# Patient Record
Sex: MALE | Race: WHITE | NOT HISPANIC OR LATINO
[De-identification: names, ages, dates, MRNs, and addresses within clinical notes are randomized per-mention and may not be internally consistent; named-entity substitution may affect disease eponyms.]

---

## 2017-10-21 ENCOUNTER — TRANSCRIPTION ENCOUNTER (OUTPATIENT)
Age: 52
End: 2017-10-21

## 2017-10-21 ENCOUNTER — INPATIENT (INPATIENT)
Facility: HOSPITAL | Age: 52
LOS: 0 days | Discharge: HOME | DRG: 352 | End: 2017-10-22
Attending: SURGERY | Admitting: SURGERY
Payer: COMMERCIAL

## 2017-10-21 VITALS
RESPIRATION RATE: 16 BRPM | DIASTOLIC BLOOD PRESSURE: 78 MMHG | TEMPERATURE: 100 F | SYSTOLIC BLOOD PRESSURE: 122 MMHG | OXYGEN SATURATION: 99 % | HEART RATE: 99 BPM | HEIGHT: 70 IN | WEIGHT: 156.09 LBS

## 2017-10-21 DIAGNOSIS — R10.84 GENERALIZED ABDOMINAL PAIN: ICD-10-CM

## 2017-10-21 LAB
ALBUMIN SERPL ELPH-MCNC: 2.8 G/DL — LOW (ref 3.3–5)
ALP SERPL-CCNC: 189 U/L — HIGH (ref 40–120)
ALT FLD-CCNC: 100 U/L — HIGH (ref 12–78)
AMYLASE P1 CFR SERPL: 53 U/L — SIGNIFICANT CHANGE UP (ref 25–115)
ANION GAP SERPL CALC-SCNC: 6 MMOL/L — SIGNIFICANT CHANGE UP (ref 5–17)
APPEARANCE UR: CLEAR — SIGNIFICANT CHANGE UP
AST SERPL-CCNC: 64 U/L — HIGH (ref 15–37)
BASOPHILS # BLD AUTO: 0.1 K/UL — SIGNIFICANT CHANGE UP (ref 0–0.2)
BASOPHILS NFR BLD AUTO: 0.7 % — SIGNIFICANT CHANGE UP (ref 0–2)
BILIRUB SERPL-MCNC: 0.5 MG/DL — SIGNIFICANT CHANGE UP (ref 0.2–1.2)
BILIRUB UR-MCNC: NEGATIVE — SIGNIFICANT CHANGE UP
BUN SERPL-MCNC: 18 MG/DL — SIGNIFICANT CHANGE UP (ref 7–23)
CALCIUM SERPL-MCNC: 8.9 MG/DL — SIGNIFICANT CHANGE UP (ref 8.5–10.1)
CHLORIDE SERPL-SCNC: 101 MMOL/L — SIGNIFICANT CHANGE UP (ref 96–108)
CO2 SERPL-SCNC: 30 MMOL/L — SIGNIFICANT CHANGE UP (ref 22–31)
COLOR SPEC: YELLOW — SIGNIFICANT CHANGE UP
CREAT SERPL-MCNC: 0.79 MG/DL — SIGNIFICANT CHANGE UP (ref 0.5–1.3)
DIFF PNL FLD: NEGATIVE — SIGNIFICANT CHANGE UP
EOSINOPHIL # BLD AUTO: 0.3 K/UL — SIGNIFICANT CHANGE UP (ref 0–0.5)
EOSINOPHIL NFR BLD AUTO: 2.7 % — SIGNIFICANT CHANGE UP (ref 0–6)
GLUCOSE SERPL-MCNC: 98 MG/DL — SIGNIFICANT CHANGE UP (ref 70–99)
GLUCOSE UR QL: NEGATIVE — SIGNIFICANT CHANGE UP
HCT VFR BLD CALC: 40.6 % — SIGNIFICANT CHANGE UP (ref 39–50)
HGB BLD-MCNC: 13.1 G/DL — SIGNIFICANT CHANGE UP (ref 13–17)
INR BLD: 1.15 RATIO — SIGNIFICANT CHANGE UP (ref 0.88–1.16)
KETONES UR-MCNC: NEGATIVE — SIGNIFICANT CHANGE UP
LACTATE SERPL-SCNC: 1.3 MMOL/L — SIGNIFICANT CHANGE UP (ref 0.7–2)
LEUKOCYTE ESTERASE UR-ACNC: NEGATIVE — SIGNIFICANT CHANGE UP
LIDOCAIN IGE QN: 147 U/L — SIGNIFICANT CHANGE UP (ref 73–393)
LYMPHOCYTES # BLD AUTO: 1.3 K/UL — SIGNIFICANT CHANGE UP (ref 1–3.3)
LYMPHOCYTES # BLD AUTO: 11.4 % — LOW (ref 13–44)
MCHC RBC-ENTMCNC: 28.1 PG — SIGNIFICANT CHANGE UP (ref 27–34)
MCHC RBC-ENTMCNC: 32.2 GM/DL — SIGNIFICANT CHANGE UP (ref 32–36)
MCV RBC AUTO: 87.1 FL — SIGNIFICANT CHANGE UP (ref 80–100)
MONOCYTES # BLD AUTO: 1.4 K/UL — HIGH (ref 0–0.9)
MONOCYTES NFR BLD AUTO: 12.1 % — HIGH (ref 1–9)
NEUTROPHILS # BLD AUTO: 8.2 K/UL — HIGH (ref 1.8–7.4)
NEUTROPHILS NFR BLD AUTO: 73 % — SIGNIFICANT CHANGE UP (ref 43–77)
NITRITE UR-MCNC: NEGATIVE — SIGNIFICANT CHANGE UP
PH UR: 7 — SIGNIFICANT CHANGE UP (ref 5–8)
PLATELET # BLD AUTO: 300 K/UL — SIGNIFICANT CHANGE UP (ref 150–400)
POTASSIUM SERPL-MCNC: 4.3 MMOL/L — SIGNIFICANT CHANGE UP (ref 3.5–5.3)
POTASSIUM SERPL-SCNC: 4.3 MMOL/L — SIGNIFICANT CHANGE UP (ref 3.5–5.3)
PROT SERPL-MCNC: 7.9 G/DL — SIGNIFICANT CHANGE UP (ref 6–8.3)
PROT UR-MCNC: NEGATIVE — SIGNIFICANT CHANGE UP
PROTHROM AB SERPL-ACNC: 12.6 SEC — SIGNIFICANT CHANGE UP (ref 9.8–12.7)
RBC # BLD: 4.67 M/UL — SIGNIFICANT CHANGE UP (ref 4.2–5.8)
RBC # FLD: 11.5 % — SIGNIFICANT CHANGE UP (ref 10.3–14.5)
SODIUM SERPL-SCNC: 137 MMOL/L — SIGNIFICANT CHANGE UP (ref 135–145)
SP GR SPEC: 1 — LOW (ref 1.01–1.02)
UROBILINOGEN FLD QL: NEGATIVE — SIGNIFICANT CHANGE UP
WBC # BLD: 11.2 K/UL — HIGH (ref 3.8–10.5)
WBC # FLD AUTO: 11.2 K/UL — HIGH (ref 3.8–10.5)

## 2017-10-21 PROCEDURE — 74020: CPT | Mod: 26

## 2017-10-21 PROCEDURE — 71020: CPT | Mod: 26

## 2017-10-21 PROCEDURE — 76870 US EXAM SCROTUM: CPT | Mod: 26

## 2017-10-21 PROCEDURE — 74177 CT ABD & PELVIS W/CONTRAST: CPT | Mod: 26

## 2017-10-21 PROCEDURE — 76705 ECHO EXAM OF ABDOMEN: CPT | Mod: 26

## 2017-10-21 PROCEDURE — 99285 EMERGENCY DEPT VISIT HI MDM: CPT

## 2017-10-21 RX ORDER — ACETAMINOPHEN 500 MG
650 TABLET ORAL ONCE
Qty: 0 | Refills: 0 | Status: COMPLETED | OUTPATIENT
Start: 2017-10-21 | End: 2017-10-21

## 2017-10-21 RX ORDER — SODIUM CHLORIDE 9 MG/ML
1000 INJECTION INTRAMUSCULAR; INTRAVENOUS; SUBCUTANEOUS
Qty: 0 | Refills: 0 | Status: COMPLETED | OUTPATIENT
Start: 2017-10-21 | End: 2017-10-21

## 2017-10-21 RX ORDER — MORPHINE SULFATE 50 MG/1
4 CAPSULE, EXTENDED RELEASE ORAL ONCE
Qty: 0 | Refills: 0 | Status: DISCONTINUED | OUTPATIENT
Start: 2017-10-21 | End: 2017-10-21

## 2017-10-21 RX ORDER — MORPHINE SULFATE 50 MG/1
4 CAPSULE, EXTENDED RELEASE ORAL EVERY 6 HOURS
Qty: 0 | Refills: 0 | Status: DISCONTINUED | OUTPATIENT
Start: 2017-10-21 | End: 2017-10-22

## 2017-10-21 RX ORDER — SODIUM CHLORIDE 9 MG/ML
1000 INJECTION, SOLUTION INTRAVENOUS
Qty: 0 | Refills: 0 | Status: DISCONTINUED | OUTPATIENT
Start: 2017-10-21 | End: 2017-10-22

## 2017-10-21 RX ORDER — PIPERACILLIN AND TAZOBACTAM 4; .5 G/20ML; G/20ML
3.38 INJECTION, POWDER, LYOPHILIZED, FOR SOLUTION INTRAVENOUS ONCE
Qty: 0 | Refills: 0 | Status: COMPLETED | OUTPATIENT
Start: 2017-10-21 | End: 2017-10-21

## 2017-10-21 RX ORDER — KETOROLAC TROMETHAMINE 30 MG/ML
15 SYRINGE (ML) INJECTION ONCE
Qty: 0 | Refills: 0 | Status: DISCONTINUED | OUTPATIENT
Start: 2017-10-21 | End: 2017-10-21

## 2017-10-21 RX ORDER — ONDANSETRON 8 MG/1
4 TABLET, FILM COATED ORAL ONCE
Qty: 0 | Refills: 0 | Status: COMPLETED | OUTPATIENT
Start: 2017-10-21 | End: 2017-10-21

## 2017-10-21 RX ORDER — CEFAZOLIN SODIUM 1 G
2000 VIAL (EA) INJECTION ONCE
Qty: 0 | Refills: 0 | Status: COMPLETED | OUTPATIENT
Start: 2017-10-21 | End: 2017-10-21

## 2017-10-21 RX ORDER — ONDANSETRON 8 MG/1
4 TABLET, FILM COATED ORAL EVERY 6 HOURS
Qty: 0 | Refills: 0 | Status: DISCONTINUED | OUTPATIENT
Start: 2017-10-21 | End: 2017-10-22

## 2017-10-21 RX ORDER — ENOXAPARIN SODIUM 100 MG/ML
40 INJECTION SUBCUTANEOUS DAILY
Qty: 0 | Refills: 0 | Status: DISCONTINUED | OUTPATIENT
Start: 2017-10-21 | End: 2017-10-22

## 2017-10-21 RX ORDER — IOHEXOL 300 MG/ML
30 INJECTION, SOLUTION INTRAVENOUS ONCE
Qty: 0 | Refills: 0 | Status: COMPLETED | OUTPATIENT
Start: 2017-10-21 | End: 2017-10-21

## 2017-10-21 RX ADMIN — IOHEXOL 30 MILLILITER(S): 300 INJECTION, SOLUTION INTRAVENOUS at 17:14

## 2017-10-21 RX ADMIN — SODIUM CHLORIDE 1000 MILLILITER(S): 9 INJECTION INTRAMUSCULAR; INTRAVENOUS; SUBCUTANEOUS at 17:15

## 2017-10-21 RX ADMIN — MORPHINE SULFATE 4 MILLIGRAM(S): 50 CAPSULE, EXTENDED RELEASE ORAL at 17:14

## 2017-10-21 RX ADMIN — ONDANSETRON 4 MILLIGRAM(S): 8 TABLET, FILM COATED ORAL at 17:14

## 2017-10-21 RX ADMIN — Medication 650 MILLIGRAM(S): at 17:16

## 2017-10-21 RX ADMIN — Medication 15 MILLIGRAM(S): at 17:14

## 2017-10-21 RX ADMIN — PIPERACILLIN AND TAZOBACTAM 200 GRAM(S): 4; .5 INJECTION, POWDER, LYOPHILIZED, FOR SOLUTION INTRAVENOUS at 17:16

## 2017-10-21 NOTE — ED PROVIDER NOTE - PROGRESS NOTE DETAILS
patient feeling better, abdomen soft, non tender, hernia reducible, spoke with surgery on call Dr. Marin, case discussed, ct and labs reviwed, to dc home, f/u as outpatient, understands to return to ER if having severe pain, prolonged vomiting or worsening of symptoms spoke with Dr. Marin, case discussed, ct and labs reviwed, will admit patient

## 2017-10-21 NOTE — ED PROVIDER NOTE - OBJECTIVE STATEMENT
51 male presents to ER c/o tactile fever and abdominal pain for the past 2 days, no vomiting, having intermitant nausea, decreased appetite. Patient states he has had right scrotal swelling 51 male presents to ER c/o tactile fever and abdominal pain for the past 2 days, no vomiting, having intermitant nausea, decreased appetite. Patient states he has had right scrotal swelling for the past few months.

## 2017-10-21 NOTE — ED PROVIDER NOTE - CARE PLAN
Principal Discharge DX:	Generalized abdominal pain  Secondary Diagnosis:	Unilateral inguinal hernia without obstruction or gangrene, recurrence not specified

## 2017-10-22 ENCOUNTER — TRANSCRIPTION ENCOUNTER (OUTPATIENT)
Age: 52
End: 2017-10-22

## 2017-10-22 VITALS
SYSTOLIC BLOOD PRESSURE: 113 MMHG | DIASTOLIC BLOOD PRESSURE: 68 MMHG | HEART RATE: 76 BPM | TEMPERATURE: 98 F | OXYGEN SATURATION: 100 % | RESPIRATION RATE: 16 BRPM

## 2017-10-22 DIAGNOSIS — K40.90 UNILATERAL INGUINAL HERNIA, WITHOUT OBSTRUCTION OR GANGRENE, NOT SPECIFIED AS RECURRENT: ICD-10-CM

## 2017-10-22 DIAGNOSIS — R10.84 GENERALIZED ABDOMINAL PAIN: ICD-10-CM

## 2017-10-22 DIAGNOSIS — K76.9 LIVER DISEASE, UNSPECIFIED: ICD-10-CM

## 2017-10-22 LAB
CULTURE RESULTS: NO GROWTH — SIGNIFICANT CHANGE UP
LACTATE SERPL-SCNC: 1 MMOL/L — SIGNIFICANT CHANGE UP (ref 0.7–2)
SPECIMEN SOURCE: SIGNIFICANT CHANGE UP

## 2017-10-22 PROCEDURE — 85027 COMPLETE CBC AUTOMATED: CPT

## 2017-10-22 PROCEDURE — 76870 US EXAM SCROTUM: CPT

## 2017-10-22 PROCEDURE — 82150 ASSAY OF AMYLASE: CPT

## 2017-10-22 PROCEDURE — 76705 ECHO EXAM OF ABDOMEN: CPT

## 2017-10-22 PROCEDURE — 36415 COLL VENOUS BLD VENIPUNCTURE: CPT

## 2017-10-22 PROCEDURE — 83690 ASSAY OF LIPASE: CPT

## 2017-10-22 PROCEDURE — 93010 ELECTROCARDIOGRAM REPORT: CPT

## 2017-10-22 PROCEDURE — 74020: CPT

## 2017-10-22 PROCEDURE — 93005 ELECTROCARDIOGRAM TRACING: CPT

## 2017-10-22 PROCEDURE — 85610 PROTHROMBIN TIME: CPT

## 2017-10-22 PROCEDURE — 83605 ASSAY OF LACTIC ACID: CPT

## 2017-10-22 PROCEDURE — C1781: CPT

## 2017-10-22 PROCEDURE — 87040 BLOOD CULTURE FOR BACTERIA: CPT

## 2017-10-22 PROCEDURE — 96365 THER/PROPH/DIAG IV INF INIT: CPT | Mod: 59

## 2017-10-22 PROCEDURE — 80053 COMPREHEN METABOLIC PANEL: CPT

## 2017-10-22 PROCEDURE — 99285 EMERGENCY DEPT VISIT HI MDM: CPT | Mod: 25

## 2017-10-22 PROCEDURE — 81003 URINALYSIS AUTO W/O SCOPE: CPT

## 2017-10-22 PROCEDURE — 87086 URINE CULTURE/COLONY COUNT: CPT

## 2017-10-22 PROCEDURE — 71046 X-RAY EXAM CHEST 2 VIEWS: CPT

## 2017-10-22 PROCEDURE — 96375 TX/PRO/DX INJ NEW DRUG ADDON: CPT

## 2017-10-22 PROCEDURE — 74177 CT ABD & PELVIS W/CONTRAST: CPT

## 2017-10-22 PROCEDURE — 96376 TX/PRO/DX INJ SAME DRUG ADON: CPT

## 2017-10-22 RX ORDER — OXYCODONE HYDROCHLORIDE 5 MG/1
10 TABLET ORAL EVERY 6 HOURS
Qty: 0 | Refills: 0 | Status: DISCONTINUED | OUTPATIENT
Start: 2017-10-22 | End: 2017-10-22

## 2017-10-22 RX ORDER — HYDROMORPHONE HYDROCHLORIDE 2 MG/ML
0.5 INJECTION INTRAMUSCULAR; INTRAVENOUS; SUBCUTANEOUS
Qty: 0 | Refills: 0 | Status: DISCONTINUED | OUTPATIENT
Start: 2017-10-22 | End: 2017-10-22

## 2017-10-22 RX ORDER — ONDANSETRON 8 MG/1
4 TABLET, FILM COATED ORAL EVERY 6 HOURS
Qty: 0 | Refills: 0 | Status: DISCONTINUED | OUTPATIENT
Start: 2017-10-22 | End: 2017-10-22

## 2017-10-22 RX ORDER — SODIUM CHLORIDE 9 MG/ML
1000 INJECTION, SOLUTION INTRAVENOUS
Qty: 0 | Refills: 0 | Status: DISCONTINUED | OUTPATIENT
Start: 2017-10-22 | End: 2017-10-22

## 2017-10-22 RX ORDER — HYDROMORPHONE HYDROCHLORIDE 2 MG/ML
1 INJECTION INTRAMUSCULAR; INTRAVENOUS; SUBCUTANEOUS EVERY 4 HOURS
Qty: 0 | Refills: 0 | Status: DISCONTINUED | OUTPATIENT
Start: 2017-10-22 | End: 2017-10-22

## 2017-10-22 RX ORDER — OXYCODONE AND ACETAMINOPHEN 5; 325 MG/1; MG/1
2 TABLET ORAL EVERY 4 HOURS
Qty: 0 | Refills: 0 | Status: DISCONTINUED | OUTPATIENT
Start: 2017-10-22 | End: 2017-10-22

## 2017-10-22 RX ORDER — OXYCODONE HYDROCHLORIDE 5 MG/1
5 TABLET ORAL EVERY 4 HOURS
Qty: 0 | Refills: 0 | Status: DISCONTINUED | OUTPATIENT
Start: 2017-10-22 | End: 2017-10-22

## 2017-10-22 RX ORDER — ENOXAPARIN SODIUM 100 MG/ML
40 INJECTION SUBCUTANEOUS DAILY
Qty: 0 | Refills: 0 | Status: DISCONTINUED | OUTPATIENT
Start: 2017-10-22 | End: 2017-10-22

## 2017-10-22 RX ORDER — INFLUENZA VIRUS VACCINE 15; 15; 15; 15 UG/.5ML; UG/.5ML; UG/.5ML; UG/.5ML
0.5 SUSPENSION INTRAMUSCULAR ONCE
Qty: 0 | Refills: 0 | Status: DISCONTINUED | OUTPATIENT
Start: 2017-10-22 | End: 2017-10-22

## 2017-10-22 RX ADMIN — SODIUM CHLORIDE 75 MILLILITER(S): 9 INJECTION, SOLUTION INTRAVENOUS at 04:16

## 2017-10-22 RX ADMIN — MORPHINE SULFATE 4 MILLIGRAM(S): 50 CAPSULE, EXTENDED RELEASE ORAL at 07:00

## 2017-10-22 RX ADMIN — MORPHINE SULFATE 4 MILLIGRAM(S): 50 CAPSULE, EXTENDED RELEASE ORAL at 07:41

## 2017-10-22 NOTE — H&P ADULT - PROBLEM SELECTOR PLAN 1
I did explain that it is possible that his bowel has been intermittently partially obstructing, and that that is the source of the abd pain, but that they may be 2 separate entities.  I offered him repair of the inguinal hernia now vs workup for the abd pain 1st with upper and lower endoscopy (which would likely be neg).  He chose to repair the hernia now.  If the pain returns post-op, I will send him to GI. I explained that I will likely place a mesh during the repair.  I explained that the risks of the procedure are including, but not limited to, bleeding, infection, bruising to the scrotum, numbness, pain, injury to hernia contents, infertility, and hernia recurrence.  He agreed for the procedure.

## 2017-10-22 NOTE — DISCHARGE NOTE ADULT - CARE PROVIDER_API CALL
Kenna Marin (MD), Surgery  [facility]  [address]  [place] [address]  Phone: [phone]  Fax: [phone]

## 2017-10-22 NOTE — DISCHARGE NOTE ADULT - HOSPITAL COURSE
Admitted, kept NPO.  Given IV hydration.  Underwent uncomplicated surgery.  Ambulated.  Voided.  Discharged home when pain controlled.

## 2017-10-22 NOTE — DISCHARGE NOTE ADULT - CARE PLAN
Principal Discharge DX:	Unilateral inguinal hernia without obstruction or gangrene, recurrence not specified  Goal:	Relief of pain  Instructions for follow-up, activity and diet:	Regular diet.  No heavy lifting or strenuous exercise for 6 weeks.  No driving or operating heavy machinery while taking narcotics.  May shower in 48 hours  Leave steri strips.  If they fall off, its ok. Ice packs on and off 20 min each for the 1st 24 hours  Secondary Diagnosis:	Liver lesion  Instructions for follow-up, activity and diet:	MRI outpatient

## 2017-10-22 NOTE — DISCHARGE NOTE ADULT - PLAN OF CARE
Relief of pain Regular diet.  No heavy lifting or strenuous exercise for 6 weeks.  No driving or operating heavy machinery while taking narcotics.  May shower in 48 hours  Leave steri strips.  If they fall off, its ok. Ice packs on and off 20 min each for the 1st 24 hours MRI outpatient

## 2017-10-22 NOTE — H&P ADULT - HISTORY OF PRESENT ILLNESS
51 y.o. M presented to the ER last night c/o upper abd pain.  The pain has since resolved.  He has been getting this pain 51 y.o. M presented to the ER last night c/o upper abd pain.  The pain has been intermittent for the past 2 weeks, and is unrelated to food.  The pain is worse when standing, then resolves when lying down.  He currently denies any abd pain.  He does c/o R scrotal swelling for at least a few weeks.  He was nauseous yesterday, but denies vomiting.  He has had a decreased appetite.  He denies back or shoulder pain.  He denies fever or chills.  He has never had an upper or lower endoscopy. 51 y.o. M presented to the ER last night c/o upper abd pain.  The pain has been intermittent for the past 2 weeks, and is unrelated to food.  The pain is worse when standing, then resolves when lying down.  He currently denies any abd pain.  He does c/o R scrotal swelling for at least a few weeks.  He was nauseous yesterday, but denies vomiting.  He has had a decreased appetite.  He denies back or shoulder pain.  He denies fever or chills.  He has never had an upper or lower endoscopy.  He denies dysuria or hematuria

## 2017-10-22 NOTE — H&P ADULT - NSHPLABSRESULTS_GEN_ALL_CORE
LABS:  CBC Full  -  ( 21 Oct 2017 17:13 )  WBC Count : 11.2 K/uL  Hemoglobin : 13.1 g/dL  Hematocrit : 40.6 %  Platelet Count - Automated : 300 K/uL  Mean Cell Volume : 87.1 fl  Mean Cell Hemoglobin : 28.1 pg  Mean Cell Hemoglobin Concentration : 32.2 gm/dL  Auto Neutrophil # : 8.2 K/uL  Auto Lymphocyte # : 1.3 K/uL  Auto Monocyte # : 1.4 K/uL  Auto Eosinophil # : 0.3 K/uL  Auto Basophil # : 0.1 K/uL  Auto Neutrophil % : 73.0 %  Auto Lymphocyte % : 11.4 %  Auto Monocyte % : 12.1 %  Auto Eosinophil % : 2.7 %  Auto Basophil % : 0.7 %    10-21    137  |  101  |  18  ----------------------------<  98  4.3   |  30  |  0.79    Ca    8.9      21 Oct 2017 17:13    TPro  7.9  /  Alb  2.8<L>  /  TBili  0.5  /  DBili  x   /  AST  64<H>  /  ALT  100<H>  /  AlkPhos  189<H>  10-21    PT/INR - ( 21 Oct 2017 17:13 )   PT: 12.6 sec;   INR: 1.15 ratio      U/A - Neg    LIVER FUNCTIONS - ( 21 Oct 2017 17:13 )  Alb: 2.8 g/dL / Pro: 7.9 g/dL / ALK PHOS: 189 U/L / ALT: 100 U/L / AST: 64 U/L / GGT: x           RADIOLOGY & ADDITIONAL STUDIES:    U/S testicles - herniated bowel  U/S Gallbladder - Neg    EXAM:  CT ABDOMEN AND PELVIS OC IC                          PROCEDURE DATE:  10/21/2017      INTERPRETATION:  CLINICAL INFORMATION: Abdominal and pelvic pain with   fever.    TECHNIQUE: Contrast enhanced CT of the abdomen and pelvis was performed   with coronal and sagittal reformats. 95 cc Omnipaque 350 were   administered intravenously and 5 cc were discarded. Oral contrast was   administered.     COMPARISON: None available.    FINDINGS:    LOWER CHEST: Within normal limits.    HEPATOBILIARY: Indeterminate right anterior hepatic lobe lesion measuring   2.2 x 1.2 x 1.6 cm (TR x AP x CC). No biliary ductal dilation.   Gallbladder within normal limits.  PANCREAS: Within normal limits.  SPLEEN: Within normal limits.  ADRENALS: Within normal limits.    KIDNEYS/URETERS/BLADDER: Distended urinary bladder with secondary   fullness of the renal collecting system bilaterally. Diverticulum at the   bladder dome.  REPRODUCTIVE ORGANS: Within normal limits.    BOWEL/PERITONEUM: Portions of the gastrointestinal tract are collapsed,   limiting evaluation. No bowel obstruction, inflammation or   pneumoperitoneum.  Normal appendix. Large right inguinal hernia   containing nonobstructed small bowel.    VESSELS:  Within normal limits.  LYMPHATICS/RETROPERITONEUM: No lymphadenopathy. No retroperitoneal   hematoma.      SOFT TISSUES: Small fat-containing left inguinal hernia.  BONES: Within normal limits.    IMPRESSION:     No bowel obstruction or discernible acute abnormality. Large right   inguinal hernia containing nonobstructed small bowel.    Indeterminate right anterior hepatic lobe lesion measuring 2.2 x 1.2 x   1.6 cm, for which nonemergent contrast-enhanced liver MRI is recommended   for further characterization.      JUDY JAMES M.D., ATTENDING RADIOLOGIST  This document has been electronically signed. Oct 21 2017  7:28PM

## 2017-10-22 NOTE — H&P ADULT - NSHPPHYSICALEXAM_GEN_ALL_CORE
Vital Signs Last 24 Hrs  T(C): 37.1 (22 Oct 2017 04:07), Max: 37.9 (21 Oct 2017 16:05)  T(F): 98.8 (22 Oct 2017 04:07), Max: 100.2 (21 Oct 2017 16:05)  HR: 96 (22 Oct 2017 04:07) (86 - 99)  BP: 125/76 (22 Oct 2017 04:07) (122/73 - 127/81)  BP(mean): --  RR: 16 (22 Oct 2017 04:07) (15 - 16)  SpO2: 98% (22 Oct 2017 04:07) (98% - 100%)    I&O's Detail    21 Oct 2017 07:01  -  22 Oct 2017 07:00  --------------------------------------------------------  IN:  Total IN: 0 mL    OUT:    Voided: 600 mL  Total OUT: 600 mL    Total NET: -600 mL      Physical Exam:  General: AAOx3; Comfortable  HEENT: No jaundice or icterus; No cervical adenopathy; Rash on R face  Lungs: BCTA  Heart: RRR  Abd: Soft, NT, ND; No hernias; No scars; normal bowel sounds  Back: No CVA tenderness  Extremities: No edema or calf tenderness; Good pulses b/l  Groin: Moderate nontender reducible RIH

## 2017-10-22 NOTE — H&P ADULT - NSHPREVIEWOFSYSTEMS_GEN_ALL_CORE
The patient denies headache, dizziness, sore throat, nasal congestion, chest pain, SOB, dysuria, hematuria, back pain, extremity pain, numbness, tingling, or weakness.

## 2017-10-22 NOTE — BRIEF OPERATIVE NOTE - PROCEDURE
<<-----Click on this checkbox to enter Procedure Right inguinal hernia repair  10/22/2017  w/ mesh  Active  [place]

## 2017-10-27 DIAGNOSIS — K76.9 LIVER DISEASE, UNSPECIFIED: ICD-10-CM

## 2017-10-27 DIAGNOSIS — K40.90 UNILATERAL INGUINAL HERNIA, WITHOUT OBSTRUCTION OR GANGRENE, NOT SPECIFIED AS RECURRENT: ICD-10-CM

## 2017-10-27 LAB
CULTURE RESULTS: SIGNIFICANT CHANGE UP
CULTURE RESULTS: SIGNIFICANT CHANGE UP
SPECIMEN SOURCE: SIGNIFICANT CHANGE UP
SPECIMEN SOURCE: SIGNIFICANT CHANGE UP

## 2019-08-30 NOTE — PATIENT PROFILE ADULT. - MEDICATION PATCH, PROFILE
Quality 226: Preventive Care And Screening: Tobacco Use: Screening And Cessation Intervention: Patient screened for tobacco use and is an ex/non-smoker Quality 131: Pain Assessment And Follow-Up: No documentation of pain assessment, reason not given Detail Level: Detailed Quality 431: Preventive Care And Screening: Unhealthy Alcohol Use - Screening: Patient screened for unhealthy alcohol use using a single question and scores less than 2 times per year none

## 2021-07-29 NOTE — DISCHARGE NOTE ADULT - HAS THE PATIENT RECEIVED THE INFLUENZA VACCINE DURING THIS VISIT
No Rx: MVI and vitamin C 500mg daily. Encourage po intake, monitor diet tolerance, and provide assistance at meals as needed. Obtain daily weights to monitor trends.

## 2021-12-22 PROBLEM — Z00.00 ENCOUNTER FOR PREVENTIVE HEALTH EXAMINATION: Status: ACTIVE | Noted: 2021-12-22

## 2021-12-22 PROBLEM — R05 COUGH: Chronic | Status: ACTIVE | Noted: 2017-10-22

## 2022-01-12 ENCOUNTER — NON-APPOINTMENT (OUTPATIENT)
Age: 57
End: 2022-01-12

## 2022-01-12 ENCOUNTER — APPOINTMENT (OUTPATIENT)
Dept: UROLOGY | Facility: CLINIC | Age: 57
End: 2022-01-12
Payer: COMMERCIAL

## 2022-01-12 VITALS
HEIGHT: 69 IN | WEIGHT: 175 LBS | OXYGEN SATURATION: 98 % | SYSTOLIC BLOOD PRESSURE: 138 MMHG | DIASTOLIC BLOOD PRESSURE: 87 MMHG | HEART RATE: 68 BPM | BODY MASS INDEX: 25.92 KG/M2

## 2022-01-12 DIAGNOSIS — R39.9 UNSPECIFIED SYMPTOMS AND SIGNS INVOLVING THE GENITOURINARY SYSTEM: ICD-10-CM

## 2022-01-12 DIAGNOSIS — R10.32 LEFT LOWER QUADRANT PAIN: ICD-10-CM

## 2022-01-12 DIAGNOSIS — Z78.9 OTHER SPECIFIED HEALTH STATUS: ICD-10-CM

## 2022-01-12 PROCEDURE — 99204 OFFICE O/P NEW MOD 45 MIN: CPT

## 2022-01-12 PROCEDURE — 99203 OFFICE O/P NEW LOW 30 MIN: CPT

## 2022-01-12 NOTE — LETTER BODY
[Dear  ___] : Dear  [unfilled], [Consult Letter:] : I had the pleasure of evaluating your patient, [unfilled]. [Please see my note below.] : Please see my note below. [Consult Closing:] : Thank you very much for allowing me to participate in the care of this patient.  If you have any questions, please do not hesitate to contact me. [Sincerely,] : Sincerely, [FreeTextEntry3] : Massimo Novoa, DO\par Genitourinary Medicine\par

## 2022-01-12 NOTE — PHYSICAL EXAM
[General Appearance - Well Developed] : well developed [General Appearance - Well Nourished] : well nourished [Normal Appearance] : normal appearance [Well Groomed] : well groomed [General Appearance - In No Acute Distress] : no acute distress [Edema] : no peripheral edema [Respiration, Rhythm And Depth] : normal respiratory rhythm and effort [Exaggerated Use Of Accessory Muscles For Inspiration] : no accessory muscle use [Abdomen Soft] : soft [Abdomen Tenderness] : non-tender [Costovertebral Angle Tenderness] : no ~M costovertebral angle tenderness [FreeTextEntry1] : left inguinal hernia [Urethral Meatus] : meatus normal [Penis Abnormality] : normal circumcised penis [Urinary Bladder Findings] : the bladder was normal on palpation [Scrotum] : the scrotum was normal [Epididymis] : the epididymides were normal [Testes Tenderness] : no tenderness of the testes [Testes Mass (___cm)] : there were no testicular masses [Prostate Enlargement] : the prostate was not enlarged [Prostate Tenderness] : the prostate was not tender [No Prostate Nodules] : no prostate nodules [Normal Station and Gait] : the gait and station were normal for the patient's age [] : no rash [No Focal Deficits] : no focal deficits [Oriented To Time, Place, And Person] : oriented to person, place, and time [Affect] : the affect was normal [Mood] : the mood was normal [Not Anxious] : not anxious

## 2022-01-12 NOTE — HISTORY OF PRESENT ILLNESS
[FreeTextEntry1] : Mr. ALBERTO ESCALANTE 56 year old  M no PMH and PSH right hernia. Pt comes in bc of a left sided hernia and frequent urination every 2 hours and post void dribbling. Urine flow is good. Pt rates erections 10/10. younger brother had colon cancer. Nonsmoker\par

## 2022-01-12 NOTE — ASSESSMENT
[FreeTextEntry1] : Plan\par UA\par PSA\par fu with general surgery for left inguinal hernia\par fu 2 weeks

## 2022-01-14 LAB
APPEARANCE: CLEAR
BACTERIA: NEGATIVE
BILIRUBIN URINE: NEGATIVE
BLOOD URINE: NEGATIVE
COLOR: NORMAL
GLUCOSE QUALITATIVE U: NEGATIVE
HYALINE CASTS: 0 /LPF
KETONES URINE: NEGATIVE
LEUKOCYTE ESTERASE URINE: NEGATIVE
MICROSCOPIC-UA: NORMAL
NITRITE URINE: NEGATIVE
PH URINE: 6.5
PROTEIN URINE: NORMAL
RED BLOOD CELLS URINE: 1 /HPF
SPECIFIC GRAVITY URINE: 1.03
SQUAMOUS EPITHELIAL CELLS: 0 /HPF
UROBILINOGEN URINE: NORMAL
WHITE BLOOD CELLS URINE: 0 /HPF

## 2022-06-13 ENCOUNTER — NON-APPOINTMENT (OUTPATIENT)
Age: 57
End: 2022-06-13

## 2022-06-14 ENCOUNTER — APPOINTMENT (OUTPATIENT)
Dept: PULMONOLOGY | Facility: CLINIC | Age: 57
End: 2022-06-14
Payer: COMMERCIAL

## 2022-06-14 ENCOUNTER — NON-APPOINTMENT (OUTPATIENT)
Age: 57
End: 2022-06-14

## 2022-06-14 VITALS
TEMPERATURE: 97.1 F | WEIGHT: 167 LBS | SYSTOLIC BLOOD PRESSURE: 127 MMHG | BODY MASS INDEX: 24.73 KG/M2 | DIASTOLIC BLOOD PRESSURE: 76 MMHG | HEIGHT: 69 IN | HEART RATE: 62 BPM | OXYGEN SATURATION: 97 %

## 2022-06-14 PROCEDURE — 99204 OFFICE O/P NEW MOD 45 MIN: CPT

## 2022-06-14 NOTE — HISTORY OF PRESENT ILLNESS
[TextBox_4] : 55 yo man referred by Dr Hughes cardiology for evaluation of rapid development of dyspnea and tachy during stress test\par \par Patient was a runner , ran [place] and actually did ultramarathons up to 50 miles\par Did this until 2009 and then could not run any more\par He continues to work at a [employer] and lifts many heavy packages but does not do any exercise similar to what he used to do\par Notes inability to take a deep breath\par Occasional cough, nonproductive\par \par However, nonsmoker, no asthma pneumonia No other pulmonary problems\par \par Recent evaluation incl stress , echo by Dr Hughes with no meds and no plans for further w/u\par Takes no meds, Nomed  allergies\par No constitutional symptoms weight 167 for past year\par Denies allergies.  2 cats at home\par s/p RIH repair 2017\par  5 years, has stepson, no biological children\par \par FH for CAD in his mother and family\par \par CXR in 2017 was completely normal [facility]\par CT abd showed normal lower lungs in 2017\par

## 2022-06-14 NOTE — ASSESSMENT
[FreeTextEntry1] : 55 yo man referred by Dr Hughes cardiology for evaluation of rapid development of dyspnea and tachy during stress test\par \par Patient was a runner , ran [place] and actually did ultramarathons up to 50 miles\par Did this until 2009 and then could not run any more\par He continues to work at a "[employer] and lifts many heavy packages but does not do any exercise similar to what he used to do\par Notes inability to take a deep breath\par Occasional cough, nonproductive\par \par However, nonsmoker, no asthma pneumonia No other pulmonary problems\par \par Recent evaluation incl stress , echo by Dr Hughes with no meds and no plans for further w/u\par Takes no meds, Nomed  allergies\par No constitutional symptoms weight 167 for past year\par Denies allergies.  2 cats at home\par s/p RIH repair 2017\par  5 years, has stepson, no biological children\par \par FH for CAD in his mother and family\par \par CXR in 2017 was completely normal [facility]\par CT abd showed normal lower lungs in 2017\par \par Imp 55 yo man with ill defined weakness and dyspnea on exertion occurring 14 years ago in prior ultramarthoner\par Recent cardiac w/u is negative but will get reports\par No known previous pulmonary disease\par PE appears normal\par Recommend PFTs and revisit after we review labs and ECHO and stress test.\par At this time, no clearcut suggestion for etiology

## 2022-08-02 LAB — SARS-COV-2 N GENE NPH QL NAA+PROBE: NOT DETECTED

## 2022-08-05 ENCOUNTER — APPOINTMENT (OUTPATIENT)
Dept: PULMONOLOGY | Facility: CLINIC | Age: 57
End: 2022-08-05

## 2022-08-05 VITALS
DIASTOLIC BLOOD PRESSURE: 72 MMHG | SYSTOLIC BLOOD PRESSURE: 126 MMHG | HEART RATE: 57 BPM | RESPIRATION RATE: 16 BRPM | OXYGEN SATURATION: 98 %

## 2022-08-05 VITALS — WEIGHT: 166 LBS | HEIGHT: 68.5 IN | BODY MASS INDEX: 24.87 KG/M2

## 2022-08-05 DIAGNOSIS — R06.00 DYSPNEA, UNSPECIFIED: ICD-10-CM

## 2022-08-05 PROCEDURE — 99214 OFFICE O/P EST MOD 30 MIN: CPT | Mod: 25

## 2022-08-05 PROCEDURE — 94010 BREATHING CAPACITY TEST: CPT

## 2022-08-05 PROCEDURE — 85018 HEMOGLOBIN: CPT | Mod: QW

## 2022-08-05 PROCEDURE — 94664 DEMO&/EVAL PT USE INHALER: CPT

## 2022-08-05 PROCEDURE — 94727 GAS DIL/WSHOT DETER LNG VOL: CPT

## 2022-08-05 NOTE — CONSULT LETTER
[Dear  ___] : Dear  [unfilled], [FreeTextEntry1] : I had the pleasure of evaluating your patient, ALBERTO ESCALANTE , in the office today.  Please review my consultation and evaluation report that follows below.  Please do not hesitate to call me if further information is necessary or if you wish to discuss ongoing care or diagnostic work-up.   \par I very much appreciate your referral and it is a privilege to be able to provide care for your patient.\par \par Sincerely,\par  \par Stepan Martines MD, MHCM, FACP, FLAVIO-C\par Pulmonary Medicine\par  of Medicine\par [facility]\par [email or web address] \par [facility] [place]\par [address]\[place]  [address]\par    Fax \par \par [facility] [place]\par [address]\par [place], [place] \par \par

## 2022-08-05 NOTE — ASSESSMENT
[FreeTextEntry1] : 55 yo man referred by Dr Hughes cardiology for evaluation of rapid development of dyspnea and tachy during stress test\par \par Patient was a runner , ran [place] and actually did ultramarathons up to 50 miles\par Did this until 2009 and then could not run any more\par He continues to work at a [employer] and lifts many heavy packages but does not do any exercise similar to what he used to do\par Notes inability to take a deep breath\par Occasional cough, nonproductive\par \par However, nonsmoker, no asthma pneumonia No other pulmonary problems\par \par Recent evaluation incl stress , echo by Dr Hughes with no meds and no plans for further w/u\par Takes no meds, Nomed allergies\par No constitutional symptoms weight 167 for past year\par Denies allergies. 2 cats at home\par s/p RIH repair 2017\par  5 years, has stepson, no biological children\par \par FH for CAD in his mother and family\par \par CXR in 2017 was completely normal [facility]\par CT abd showed normal lower lungs in 2017\par \par  55 yo man with ill defined weakness and dyspnea on exertion occurring 14 years ago in prior ultramarthoner\par Recent cardiac w/u is negative but will get reports\par No known previous pulmonary disease\par PE appears normal\par \par Interim:\par Patient did go for ECHO and stress test with Dr Hughes which were normal--reviewed reports \par \par Imp 55 yo man, exathlete has dyspnea on minimal exertion now\par Not at all clear as to etiology\par PFTs normal, PE is normal\par Will order labs, including D Dimer, repeat CXR\par CTA in d dimer elev\par Consider cardiopulmonary exercise testing

## 2022-08-05 NOTE — HISTORY OF PRESENT ILLNESS
[TextBox_4] : 57 yo man referred by Dr Hughes cardiology for evaluation of rapid development of dyspnea and tachy during stress test\par \par Patient was a runner , ran [place] and actually did ultramarathons up to 50 miles\par Did this until 2009 and then could not run any more\par He continues to work at a [employer] and lifts many heavy packages but does not do any exercise similar to what he used to do\par Notes inability to take a deep breath\par Occasional cough, nonproductive\par \par However, nonsmoker, no asthma pneumonia No other pulmonary problems\par \par Recent evaluation incl stress , echo by Dr Hughes with no meds and no plans for further w/u\par Takes no meds, Nomed allergies\par No constitutional symptoms weight 167 for past year\par Denies allergies. 2 cats at home\par s/p RIH repair 2017\par  5 years, has stepson, no biological children\par \par FH for CAD in his mother and family\par \par CXR in 2017 was completely normal [facility]\par CT abd showed normal lower lungs in 2017\par \par  57 yo man with ill defined weakness and dyspnea on exertion occurring 14 years ago in prior ultramarthoner\par Recent cardiac w/u is negative but will get reports\par No known previous pulmonary disease\par PE appears normal\par \par Interim:\par Patient did go for ECHO and stress test with Dr Hughes which were normal--reviewed reports \par Here for PFTs

## 2022-09-23 ENCOUNTER — APPOINTMENT (OUTPATIENT)
Dept: PULMONOLOGY | Facility: CLINIC | Age: 57
End: 2022-09-23